# Patient Record
Sex: FEMALE | Race: WHITE | Employment: OTHER | URBAN - METROPOLITAN AREA
[De-identification: names, ages, dates, MRNs, and addresses within clinical notes are randomized per-mention and may not be internally consistent; named-entity substitution may affect disease eponyms.]

---

## 2017-02-16 ENCOUNTER — OFFICE VISIT (OUTPATIENT)
Dept: INTERNAL MEDICINE CLINIC | Age: 72
End: 2017-02-16

## 2017-02-16 VITALS
OXYGEN SATURATION: 95 % | TEMPERATURE: 98.9 F | SYSTOLIC BLOOD PRESSURE: 120 MMHG | WEIGHT: 137 LBS | HEIGHT: 61 IN | BODY MASS INDEX: 25.86 KG/M2 | HEART RATE: 90 BPM | DIASTOLIC BLOOD PRESSURE: 76 MMHG

## 2017-02-16 DIAGNOSIS — J06.9 ACUTE URI: Primary | ICD-10-CM

## 2017-02-16 PROCEDURE — 99213 OFFICE O/P EST LOW 20 MIN: CPT | Performed by: INTERNAL MEDICINE

## 2017-02-16 RX ORDER — AZITHROMYCIN 250 MG/1
TABLET, FILM COATED ORAL
Qty: 6 TABLET | Refills: 0 | Status: SHIPPED | OUTPATIENT
Start: 2017-02-16 | End: 2017-02-20

## 2017-02-16 RX ORDER — HYDROCODONE POLISTIREX AND CHLORPHENIRAMINE POLISTIREX 10; 8 MG/5ML; MG/5ML
5 SUSPENSION, EXTENDED RELEASE ORAL EVERY 12 HOURS PRN
Qty: 120 ML | Refills: 0 | Status: SHIPPED | OUTPATIENT
Start: 2017-02-16 | End: 2017-02-20 | Stop reason: SDUPTHER

## 2017-02-16 ASSESSMENT — ENCOUNTER SYMPTOMS
SINUS PRESSURE: 0
APNEA: 0
BACK PAIN: 0
GASTROINTESTINAL NEGATIVE: 1
COLOR CHANGE: 0
CHEST TIGHTNESS: 1
CHOKING: 0
WHEEZING: 0
EYES NEGATIVE: 1
COUGH: 1
STRIDOR: 0
VOICE CHANGE: 1
SORE THROAT: 1

## 2017-02-20 ENCOUNTER — OFFICE VISIT (OUTPATIENT)
Dept: INTERNAL MEDICINE CLINIC | Age: 72
End: 2017-02-20

## 2017-02-20 ENCOUNTER — HOSPITAL ENCOUNTER (OUTPATIENT)
Dept: NON INVASIVE DIAGNOSTICS | Age: 72
Discharge: HOME | End: 2017-02-20
Attending: INTERNAL MEDICINE | Admitting: INTERNAL MEDICINE

## 2017-02-20 ENCOUNTER — TELEPHONE (OUTPATIENT)
Dept: INTERNAL MEDICINE CLINIC | Age: 72
End: 2017-02-20

## 2017-02-20 VITALS
DIASTOLIC BLOOD PRESSURE: 76 MMHG | TEMPERATURE: 97.8 F | WEIGHT: 137 LBS | HEART RATE: 90 BPM | BODY MASS INDEX: 25.86 KG/M2 | SYSTOLIC BLOOD PRESSURE: 120 MMHG | HEIGHT: 61 IN | OXYGEN SATURATION: 99 %

## 2017-02-20 DIAGNOSIS — J06.9 ACUTE URI: ICD-10-CM

## 2017-02-20 DIAGNOSIS — J06.9 ACUTE URI: Primary | ICD-10-CM

## 2017-02-20 PROCEDURE — 99213 OFFICE O/P EST LOW 20 MIN: CPT | Performed by: INTERNAL MEDICINE

## 2017-02-20 RX ORDER — LEVOFLOXACIN 500 MG/1
500 TABLET, FILM COATED ORAL DAILY
Qty: 16 TABLET | Refills: 0 | Status: SHIPPED | OUTPATIENT
Start: 2017-02-20 | End: 2017-03-02

## 2017-02-20 RX ORDER — LEVOFLOXACIN 500 MG/1
500 TABLET, FILM COATED ORAL DAILY
Qty: 16 TABLET | Refills: 0 | Status: SHIPPED | OUTPATIENT
Start: 2017-02-20 | End: 2017-03-08

## 2017-02-20 RX ORDER — LEVOFLOXACIN 500 MG/1
500 TABLET, FILM COATED ORAL DAILY
Qty: 10 TABLET | Refills: 0 | Status: SHIPPED | OUTPATIENT
Start: 2017-02-20 | End: 2017-02-20

## 2017-02-20 RX ORDER — HYDROCODONE POLISTIREX AND CHLORPHENIRAMINE POLISTIREX 10; 8 MG/5ML; MG/5ML
5 SUSPENSION, EXTENDED RELEASE ORAL EVERY 12 HOURS PRN
Qty: 120 ML | Refills: 0 | Status: SHIPPED | OUTPATIENT
Start: 2017-02-20 | End: 2020-01-06 | Stop reason: ALTCHOICE

## 2017-02-20 ASSESSMENT — ENCOUNTER SYMPTOMS
APNEA: 0
EYES NEGATIVE: 1
CHOKING: 0
STRIDOR: 0
COUGH: 1
BACK PAIN: 0
SHORTNESS OF BREATH: 1
GASTROINTESTINAL NEGATIVE: 1
SINUS PRESSURE: 0
WHEEZING: 0
COLOR CHANGE: 0
HEMOPTYSIS: 0

## 2017-10-30 ENCOUNTER — HOSPITAL ENCOUNTER (OUTPATIENT)
Dept: WOMENS IMAGING | Age: 72
Discharge: HOME | End: 2017-10-30
Attending: INTERNAL MEDICINE | Admitting: INTERNAL MEDICINE

## 2017-10-30 DIAGNOSIS — Z12.31 VISIT FOR SCREENING MAMMOGRAM: ICD-10-CM

## 2018-05-17 ENCOUNTER — HOSPITAL ENCOUNTER (OUTPATIENT)
Dept: WOMENS IMAGING | Age: 73
Discharge: HOME | End: 2018-05-17
Attending: INTERNAL MEDICINE | Admitting: INTERNAL MEDICINE

## 2018-05-17 DIAGNOSIS — N95.9 MENOPAUSAL AND PERIMENOPAUSAL DISORDER: ICD-10-CM

## 2018-05-17 DIAGNOSIS — N95.9 UNSPECIFIED MENOPAUSAL AND PERIMENOPAUSAL DISORDER: ICD-10-CM

## 2020-01-06 ENCOUNTER — OFFICE VISIT (OUTPATIENT)
Dept: INTERNAL MEDICINE CLINIC | Age: 75
End: 2020-01-06
Payer: MEDICARE

## 2020-01-06 VITALS
WEIGHT: 135 LBS | TEMPERATURE: 97.8 F | HEIGHT: 61 IN | SYSTOLIC BLOOD PRESSURE: 122 MMHG | DIASTOLIC BLOOD PRESSURE: 80 MMHG | BODY MASS INDEX: 25.49 KG/M2

## 2020-01-06 PROCEDURE — 99214 OFFICE O/P EST MOD 30 MIN: CPT | Performed by: INTERNAL MEDICINE

## 2020-01-06 RX ORDER — SCOLOPAMINE TRANSDERMAL SYSTEM 1 MG/1
1 PATCH, EXTENDED RELEASE TRANSDERMAL
Qty: 5 PATCH | Refills: 0 | Status: SHIPPED | OUTPATIENT
Start: 2020-01-06 | End: 2022-05-12

## 2020-01-06 RX ORDER — MECLIZINE HCL 12.5 MG/1
12.5 TABLET ORAL 3 TIMES DAILY PRN
Qty: 30 TABLET | Refills: 1 | Status: SHIPPED | OUTPATIENT
Start: 2020-01-06 | End: 2020-01-16

## 2020-01-06 ASSESSMENT — ENCOUNTER SYMPTOMS
NAUSEA: 1
VOMITING: 1

## 2020-01-06 ASSESSMENT — PATIENT HEALTH QUESTIONNAIRE - PHQ9
SUM OF ALL RESPONSES TO PHQ QUESTIONS 1-9: 0
1. LITTLE INTEREST OR PLEASURE IN DOING THINGS: 0
2. FEELING DOWN, DEPRESSED OR HOPELESS: 0
SUM OF ALL RESPONSES TO PHQ QUESTIONS 1-9: 0
SUM OF ALL RESPONSES TO PHQ9 QUESTIONS 1 & 2: 0

## 2020-01-06 NOTE — PROGRESS NOTES
tenderness. Musculoskeletal:         General: No tenderness. Lymphadenopathy:      Cervical: No cervical adenopathy. Skin:     General: Skin is warm. Neurological:      Mental Status: She is alert. Assessment:      Encounter Diagnoses   Name Primary?  Vertigo Yes    Motion sickness, initial encounter            Plan:      Roxanna Macias was seen today for headache. Diagnoses and all orders for this visit:    Vertigo    Motion sickness, initial encounter    Other orders  -     meclizine (ANTIVERT) 12.5 MG tablet;  Take 1 tablet by mouth 3 times daily as needed for Dizziness or Nausea  -     scopolamine (TRANSDERM-SCOP, 1.5 MG,) transdermal patch; Place 1 patch onto the skin every 72 hours              Juliana Emerson MD

## 2022-05-16 ENCOUNTER — ANESTHESIA EVENT (OUTPATIENT)
Dept: ENDOSCOPY | Age: 77
End: 2022-05-16
Payer: MEDICARE

## 2022-05-17 ENCOUNTER — ANESTHESIA (OUTPATIENT)
Dept: ENDOSCOPY | Age: 77
End: 2022-05-17
Payer: MEDICARE

## 2022-05-17 ENCOUNTER — HOSPITAL ENCOUNTER (OUTPATIENT)
Age: 77
Setting detail: OUTPATIENT SURGERY
Discharge: HOME | End: 2022-05-17
Attending: INTERNAL MEDICINE | Admitting: INTERNAL MEDICINE
Payer: MEDICARE

## 2022-05-17 VITALS
TEMPERATURE: 96.9 F | RESPIRATION RATE: 18 BRPM | SYSTOLIC BLOOD PRESSURE: 118 MMHG | BODY MASS INDEX: 24.84 KG/M2 | HEIGHT: 62 IN | DIASTOLIC BLOOD PRESSURE: 76 MMHG | HEART RATE: 71 BPM | WEIGHT: 135 LBS | OXYGEN SATURATION: 98 %

## 2022-05-17 DIAGNOSIS — Z86.010 HISTORY OF COLON POLYPS: ICD-10-CM

## 2022-05-17 DIAGNOSIS — R14.0 ABDOMINAL BLOATING: ICD-10-CM

## 2022-05-17 PROCEDURE — 2580000003 HC RX 258: Performed by: ANESTHESIOLOGY

## 2022-05-17 PROCEDURE — 2709999900 HC NON-CHARGEABLE SUPPLY: Performed by: INTERNAL MEDICINE

## 2022-05-17 PROCEDURE — 88305 TISSUE EXAM BY PATHOLOGIST: CPT

## 2022-05-17 PROCEDURE — 3609012400 HC EGD TRANSORAL BIOPSY SINGLE/MULTIPLE: Performed by: INTERNAL MEDICINE

## 2022-05-17 PROCEDURE — 6360000002 HC RX W HCPCS: Performed by: NURSE ANESTHETIST, CERTIFIED REGISTERED

## 2022-05-17 PROCEDURE — 7100000010 HC PHASE II RECOVERY - FIRST 15 MIN: Performed by: INTERNAL MEDICINE

## 2022-05-17 PROCEDURE — 7100000011 HC PHASE II RECOVERY - ADDTL 15 MIN: Performed by: INTERNAL MEDICINE

## 2022-05-17 PROCEDURE — 3700000001 HC ADD 15 MINUTES (ANESTHESIA): Performed by: INTERNAL MEDICINE

## 2022-05-17 PROCEDURE — 3700000000 HC ANESTHESIA ATTENDED CARE: Performed by: INTERNAL MEDICINE

## 2022-05-17 PROCEDURE — 3609027000 HC COLONOSCOPY: Performed by: INTERNAL MEDICINE

## 2022-05-17 RX ORDER — SODIUM CHLORIDE 0.9 % (FLUSH) 0.9 %
5-40 SYRINGE (ML) INJECTION PRN
Status: DISCONTINUED | OUTPATIENT
Start: 2022-05-17 | End: 2022-05-17 | Stop reason: HOSPADM

## 2022-05-17 RX ORDER — PROPOFOL 10 MG/ML
INJECTION, EMULSION INTRAVENOUS PRN
Status: DISCONTINUED | OUTPATIENT
Start: 2022-05-17 | End: 2022-05-17 | Stop reason: SDUPTHER

## 2022-05-17 RX ORDER — SODIUM CHLORIDE 9 MG/ML
INJECTION, SOLUTION INTRAVENOUS PRN
Status: DISCONTINUED | OUTPATIENT
Start: 2022-05-17 | End: 2022-05-17 | Stop reason: HOSPADM

## 2022-05-17 RX ORDER — PROPOFOL 10 MG/ML
INJECTION, EMULSION INTRAVENOUS CONTINUOUS PRN
Status: DISCONTINUED | OUTPATIENT
Start: 2022-05-17 | End: 2022-05-17 | Stop reason: SDUPTHER

## 2022-05-17 RX ORDER — SODIUM CHLORIDE 0.9 % (FLUSH) 0.9 %
5-40 SYRINGE (ML) INJECTION EVERY 12 HOURS SCHEDULED
Status: DISCONTINUED | OUTPATIENT
Start: 2022-05-17 | End: 2022-05-17 | Stop reason: HOSPADM

## 2022-05-17 RX ADMIN — PROPOFOL 100 MCG/KG/MIN: 10 INJECTION, EMULSION INTRAVENOUS at 11:08

## 2022-05-17 RX ADMIN — SODIUM CHLORIDE 5 ML/HR: 9 INJECTION, SOLUTION INTRAVENOUS at 10:29

## 2022-05-17 RX ADMIN — PROPOFOL 80 MG: 10 INJECTION, EMULSION INTRAVENOUS at 11:08

## 2022-05-17 ASSESSMENT — PAIN - FUNCTIONAL ASSESSMENT: PAIN_FUNCTIONAL_ASSESSMENT: NONE - DENIES PAIN

## 2022-05-17 ASSESSMENT — PAIN SCALES - WONG BAKER: WONGBAKER_NUMERICALRESPONSE: 0

## 2022-05-17 ASSESSMENT — ENCOUNTER SYMPTOMS: SHORTNESS OF BREATH: 0

## 2022-05-17 ASSESSMENT — LIFESTYLE VARIABLES: SMOKING_STATUS: 0

## 2022-05-17 NOTE — OP NOTE
EGD PROCEDURE NOTE         Esophagogastroduodenoscopy Procedure Note     Patient: Genet Michel MRN: [id number]   YOB: 1945 Age: 68 y.o. Sex: female       Admitting Physician: Orly Barbour     Primary Care Physician: Mario Holm MD, MD      DATE OF PROCEDURE: 5/17/2022  PROCEDURE: Esophagogastroduodenoscopy  INDICATION: This is a 68y.o. year old female who presents with chronic abdominal bloating  ENDOSCOPIST: Mynor Wilhelm MD    POSTOPERATIVE DIAGNOSIS:   1.  Mild erythematous gastropathy, nonspecific and probably nonsignificant, gastric biopsies done  2. Normal-appearing duodenum, biopsied    PLAN:    1. Follow-up biopsy; the patient to contact me in 1 week for results    INFORMED CONSENT:  Informed consent for esophagogastoduodenoscopy was obtained. The benefits and risks including adverse medicine reaction have been explained. The patient's questions were answered and the patient agreed to proceed. ASA:  ASA 2 - Patient with mild systemic disease with no functional limitations    SEDATION: MAC     The patient's vital signs, cardiac status, pulmonary status, abdominal status and mental status were stable for the procedure. The patient's vitals signs and respiratory function as monitored by oxygen saturation were stable throughout    Procedure Details: The Olympus videoendoscope was inserted into the mouth and carefully passed into the esophagus, through the stomach and to the distal duodenum. Antegrade and retrograde examination of the upper gi tract was carefully performed. Findings: The esophagus is normal.  The z-line is distinct without lesions or irregularities. There is no evidence of Tomlinson's esophagus. The scope easily passed into the stomach. There is some mild diffuse some easily erythema of the antrum.   Otherwise the mucosa of the cardia, fundus and body of the stomach is normal.    Random biopsies were taken to rule out a significant gastritis and Helicobacter pylori infection. The pylorus is patent and of normal contour. The scope easily advanced into the duodenal bulb and down to the distal duodenum. Ante-and retrograde exam of the duodenum is normal.  The scope was readvanced into the distal duodenum and biopsies were taken from here and from the duodenal bulb to rule out celiac disease.     Gastric or Duodenal ulcer present: No    Estimated Blood Loss: Minimal      Signed By: Princess Morfin MD

## 2022-05-17 NOTE — H&P
Gastroenterology Outpatient History and Physical     Patient: Genet Michel MRN: [id number] Sex: female   YOB: 1945 Age: 68 y.o. Location: [address] [place] [address] [facility]    Date:5/17/2022  Primary Care Physician: Mario Holm MD, MD         Patient: Genet Michel    Physician: Mynor Wilhelm MD    History of Present Illness: Abdominal bloating and history of colon polyp  Review of Systems:  Weight Loss: No  Dysphagia: No  Dyspepsia: No  History:  Past Medical History:   Diagnosis Date    Cataracts, bilateral 6/2013    Diverticulitis 2006    Hernia of pelvic floor 7/2012    Mass of right thigh 10/2015    Varicose veins       Past Surgical History:   Procedure Laterality Date    CATARACT REMOVAL WITH IMPLANT Left 7/24/13    Dr Dasia Nieves    COLONOSCOPY  2016    FEMORAL HERNIA REPAIR Right 7/2012    Dr Carlos Thomas PRE-MALIGNANT / BENIGN SKIN LESION EXCISION      VARICOSE VEIN SURGERY Right 12/2013      Social History     Socioeconomic History    Marital status:      Spouse name: None    Number of children: None    Years of education: None    Highest education level: None   Occupational History    None   Tobacco Use    Smoking status: Never Smoker    Smokeless tobacco: Never Used   Vaping Use    Vaping Use: Never used   Substance and Sexual Activity    Alcohol use: Yes     Comment: occasionally    Drug use: No    Sexual activity: None   Other Topics Concern    None   Social History Narrative    None     Social Determinants of Health     Financial Resource Strain:     Difficulty of Paying Living Expenses: Not on file   Food Insecurity:     Worried About Running Out of Food in the Last Year: Not on file    [place] of Food in the Last Year: Not on file   Transportation Needs:     Lack of Transportation (Medical): Not on file    Lack of Transportation (Non-Medical):  Not on file   Physical Activity:  Days of Exercise per Week: Not on file    Minutes of Exercise per Session: Not on file   Stress:     Feeling of Stress : Not on file   Social Connections:     Frequency of Communication with Friends and Family: Not on file    Frequency of Social Gatherings with Friends and Family: Not on file    Attends Faith Services: Not on file    Active Member of [address] [place] [address] [employer] or Organizations: Not on file    Attends Club or Organization Meetings: Not on file    Marital Status: Not on file   Intimate Partner Violence:     Fear of Current or Ex-Partner: Not on file    Emotionally Abused: Not on file    Physically Abused: Not on file    Sexually Abused: Not on file   Housing Stability:     Unable to Pay for Housing in the Last Year: Not on file    Number of [place] in the Last Year: Not on file    Unstable Housing in the Last Year: Not on file      Family History   Problem Relation Age of Onset    Arthritis Mother     High Blood Pressure Mother     Stroke Father      Allergies: Allergies   Allergen Reactions    Adhesive Tape Other (See Comments)     redness    Amoxicillin      nausea     Medications:   Prior to Admission medications    Not on File       Vital Signs: BP (!) 141/75   Pulse 72   Temp 97.7 °F (36.5 °C) (Temporal)   Resp 17   Ht 5' 1.5\" (1.562 m)   Wt 135 lb (61.2 kg)   SpO2 96%   BMI 25.09 kg/m²   Physical Exam:   Heart: regular   Lungs: non-labored breathing  Mental status:  Alert and oriented    ASA score:  ASA 2 - Patient with mild systemic disease with no functional limitations{  Mallimpati score:  2     Planned Procedure: egd and colonoscopy    Planned Sedation: Monitored anesthesia.     Signed By: Kalia Mcdaniel MD   May 17, 2022

## 2022-05-17 NOTE — ANESTHESIA PRE PROCEDURE
Department of Anesthesiology  Preprocedure Note       Name:  Carlee Almeida   Age:  68 y.o.  :  1945                                          MRN:  [id number]         Date:  2022      Surgeon: Marcelo Gilliland):  Petrona Nieves MD    Procedure: Procedure(s):  COLONOSCOPY DIAGNOSTIC  EGD ESOPHAGOGASTRODUODENOSCOPY    Medications prior to admission:   Prior to Admission medications    Not on File       Current medications:    Current Facility-Administered Medications   Medication Dose Route Frequency Provider Last Rate Last Admin    sodium chloride flush 0.9 % injection 5-40 mL  5-40 mL IntraVENous 2 times per day Kermit Dubin, MD        sodium chloride flush 0.9 % injection 5-40 mL  5-40 mL IntraVENous PRN Kermit Dubin, MD        0.9 % sodium chloride infusion   IntraVENous PRN Kermit Dubin, MD 5 mL/hr at 22 1029 5 mL/hr at 22 1029       Allergies:     Allergies   Allergen Reactions    Adhesive Tape Other (See Comments)     redness    Amoxicillin      nausea       Problem List:    Patient Active Problem List   Diagnosis Code    Sacroiliitis, not elsewhere classified ([address] M46.1    Lumbago M54.50    Pain in thoracic spine M54.6    Synovitis of right foot subtalar joint M65.9       Past Medical History:        Diagnosis Date    Cataracts, bilateral 2013    Diverticulitis 2006    Hernia of pelvic floor 2012    Mass of right thigh 10/2015    Varicose veins        Past Surgical History:        Procedure Laterality Date    CATARACT REMOVAL WITH IMPLANT Left 13    Dr Jose Khan    COLONOSCOPY  2016    FEMORAL HERNIA REPAIR Right 2012    Dr Reuben Agustin PRE-MALIGNANT / BENIGN SKIN LESION EXCISION      VARICOSE VEIN SURGERY Right 2013       Social History:    Social History     Tobacco Use    Smoking status: Never Smoker    Smokeless tobacco: Never Used   Substance Use Topics    Alcohol use: Yes     Comment: occasionally Counseling given: Not Answered      Vital Signs (Current):   Vitals:    05/12/22 1607 05/17/22 1025   BP:  (!) 141/75   Pulse:  72   Resp:  17   Temp:  97.7 °F (36.5 °C)   TempSrc:  Temporal   SpO2:  96%   Weight: 140 lb (63.5 kg) 135 lb (61.2 kg)   Height: 5' 1.5\" (1.562 m) 5' 1.5\" (1.562 m)                                              BP Readings from Last 3 Encounters:   05/17/22 (!) 141/75   01/06/20 122/80   02/20/17 120/76       NPO Status: Time of last liquid consumption: 0800                        Time of last solid consumption: 1830                        Date of last liquid consumption: 05/17/22                        Date of last solid food consumption: 05/14/22    BMI:   Wt Readings from Last 3 Encounters:   05/17/22 135 lb (61.2 kg)   01/06/20 135 lb (61.2 kg)   02/20/17 137 lb (62.1 kg)     Body mass index is 25.09 kg/m². CBC:   Lab Results   Component Value Date    WBC 5.3 10/12/2015    RBC 4.66 10/12/2015    HGB 13.6 10/12/2015    HCT 41.5 10/12/2015    MCV 89.0 10/12/2015    RDW 13.6 10/12/2015     10/12/2015       CMP:   Lab Results   Component Value Date     10/12/2015    K 4.3 10/12/2015     10/12/2015    CO2 28 10/12/2015    BUN 15 10/12/2015    CREATININE 0.9 10/12/2015    GFRAA >60 10/12/2015    GFRAA >60 06/26/2012    AGRATIO 1.6 06/26/2012    LABGLOM >60 10/12/2015    GLUCOSE 91 10/12/2015    PROT 7.1 06/26/2012    CALCIUM 8.8 10/12/2015    BILITOT 0.30 06/26/2012    ALKPHOS 78 06/26/2012    AST 21 06/26/2012    ALT 16 06/26/2012       POC Tests: No results for input(s): POCGLU, POCNA, POCK, POCCL, POCBUN, POCHEMO, POCHCT in the last 72 hours.     Coags: No results found for: PROTIME, INR, APTT    HCG (If Applicable): No results found for: PREGTESTUR, PREGSERUM, HCG, HCGQUANT     ABGs: No results found for: PHART, PO2ART, GNL4SPW, UXS1LJD, BEART, B3DJUQHA     Type & Screen (If Applicable):  No results found for: LABABO,

## 2022-05-17 NOTE — OP NOTE
COLONOSCOPY     Patient: Miriam Campos MRN: [id number]   YOB: 1945 Age: 68 y.o. Sex: female       Admitting Physician: James Melton     Primary Care Physician: Selina Conteh MD, MD      DATE OF PROCEDURE: 5/17/2022  PROCEDURE: Colonoscopy    PREOPERATIVE DIAGNOSIS: History of colon polyps, Abdominal bloating  HPI: This is a 68y.o. year old female who presents today for colon cancer screening and screening colonoscopy. The patient has had abdominal bloating and has a history of colon polyps. ENDOSCOPIST: Jerri Warren MD    POSTOPERATIVE DIAGNOSIS:    1. Melanosis coli  2. Diverticulosis  3. Hemorrhoids    PLAN:   1. Follow-up biopsies from the EGD    INFORMED CONSENT:  Informed consent for colonoscopy was obtained. The benefits and risks including adverse medicine reaction and perforation have been explained. The patient's questions were answered and the patient agreed to proceed. ASA: ASA 2 - Patient with mild systemic disease with no functional limitations     SEDATION: MAC    The patient's vital signs, cardiac status, pulmonary status, abdominal status and mental status were stable for the procedure. The patient's vital signs and respiratory function as monitored by oxygen saturation remained stable. COLON PREPARATION:  The patient was given a split colon preparation and the preparation was adequate. Procedure Details: An anal exam was performed and this was unremarkable. A digital rectal exam was performed and no masses palpated. The Olympus videocolonoscope  was inserted in the rectum and carefully advanced to the cecum as identified by IC valve, crow's foot appearance and appendix. The cecum was photodocumented. The colonoscope was slowly withdrawn and retrograde examination of the colon was carefully performed with inspection around and between folds.  The ascending colon and cecum were intubated twice with repeat antegrade and retrograde examination. Retroflexion in the rectum was performed. Cecum Intubated: Yes    Findings: There is diffuse mucosal change consistent with melanosis coli. This was photographed. There are diverticula from the sigmoid colon through the hepatic flexure. There are no significant polyps or tumors. On retroflexion of the scope in the rectum, small hemorrhoids are noted.     Estimated Blood Loss:  None  Complications: None    Signed By: Jerri Warren MD

## 2022-05-17 NOTE — ANESTHESIA POSTPROCEDURE EVALUATION
[facility] Department of Anesthesiology  Post-Anesthesia Note       Name:  Miguel Angel Rubio                                  Age:  68 y.o. MRN:  [id number]     Last Vitals & Oxygen Saturation: /76   Pulse 71   Temp 96.9 °F (36.1 °C) (Temporal)   Resp 18   Ht 5' 1.5\" (1.562 m)   Wt 135 lb (61.2 kg)   SpO2 98%   BMI 25.09 kg/m²   Patient Vitals for the past 4 hrs:   BP Temp Temp src Pulse Resp SpO2 Height Weight   05/17/22 1149 118/76   71 18 98 %     05/17/22 1143 101/63   77 18 99 %     05/17/22 1139 (!) 91/58   76 18 98 %     05/17/22 1133 (!) 79/49 96.9 °F (36.1 °C) Temporal 67 18 96 %     05/17/22 1025 (!) 141/75 97.7 °F (36.5 °C) Temporal 72 17 96 % 5' 1.5\" (1.562 m) 135 lb (61.2 kg)       Level of consciousness:  Awake, alert    Respiratory: Respirations easy, no distress. Stable. Cardiovascular: Hemodynamically stable. Hydration: Adequate. PONV: Adequately managed. Post-op pain: Adequately controlled. Post-op assessment: Tolerated anesthetic well without complication. Complications:  None.     Elaine Fall MD  May 17, 2022   12:40 PM

## 2022-05-21 NOTE — RESULT ENCOUNTER NOTE
Egd and colonoscopy was done for bloating and history of polyps. Egd was negative. Colonoscopy showed melanosis, diverticulosis and hemorrhoids.

## (undated) DEVICE — FORCEPS BX 240CM 2.4MM L NDL RAD JAW 4 M00513334